# Patient Record
Sex: FEMALE | Race: WHITE
[De-identification: names, ages, dates, MRNs, and addresses within clinical notes are randomized per-mention and may not be internally consistent; named-entity substitution may affect disease eponyms.]

---

## 2019-06-23 ENCOUNTER — HOSPITAL ENCOUNTER (EMERGENCY)
Dept: HOSPITAL 74 - JERFT | Age: 19
Discharge: HOME | End: 2019-06-23
Payer: COMMERCIAL

## 2019-06-23 VITALS — BODY MASS INDEX: 20.7 KG/M2

## 2019-06-23 VITALS — TEMPERATURE: 98.6 F | SYSTOLIC BLOOD PRESSURE: 115 MMHG | HEART RATE: 101 BPM | DIASTOLIC BLOOD PRESSURE: 74 MMHG
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
## 2019-06-23 DIAGNOSIS — T81.31XA: Primary | ICD-10-CM

## 2019-06-23 NOTE — PDOC
History of Present Illness





- General


Chief Complaint: Revisit,Wound Recheck


Stated Complaint: STITCHES OPENING


Time Seen by Provider: 06/23/19 14:52


History Source: Patient


Exam Limitations: No Limitations





- History of Present Illness


Initial Comments: 





06/23/19 15:23


atient came for evaluation of postop breast reduction/plastic surgery performed 

in the [facility] [place] surgery was relatively uncomplicated, sutures were 

removed on Monday/5 days agobut noted yesterday some dehiscence of the wounds 

on the under aspect of breast suture line. Has some serous fluid but no 

purulent drainage, no fevers, is not painful. To take some Levaquin that she 

bought in the [place] "just in case"  


Severity: mild, moderate


Associated Symptoms: denies: fever/chills, headaches





Past History





- Travel


Traveled outside of the country in the last 30 days: No


Close contact w/someone who was outside of country & ill: No





- Past Medical History


Allergies/Adverse Reactions: 


 Allergies











Allergy/AdvReac Type Severity Reaction Status Date / Time


 


No Known Allergies Allergy   Verified 06/23/19 14:47











Home Medications: 


Ambulatory Orders





Cephalexin Monohydrate [Keflex -] 500 mg PO Q8H #21 capsule 06/23/19 








COPD: No





- Suicide/Smoking/Psychosocial Hx


Smoking History: Never smoked





**Review of Systems





- Review of Systems


Able to Perform ROS?: Yes


Is the patient limited English proficient: Yes


Constitutional: Yes: Symptoms Reported, See HPI, Malaise.  No: Chills, Fever


HEENTM: No: Symptoms Reported


Respiratory: No: Symptoms reported


Integumentary: Yes: Symptoms Reported, See HPI, Lesions, Lumps


Neurological: No: Symptoms reported


All Other Systems: Reviewed and Negative





*Physical Exam





- Vital Signs


 Last Vital Signs











Temp Pulse Resp BP Pulse Ox


 


 98.6 F   101 H  20   115/74   100 


 


 06/23/19 14:43  06/23/19 14:43  06/23/19 14:43  06/23/19 14:43  06/23/19 14:43














- Physical Exam


General Appearance: Yes: Nourished, Appropriately Dressed.  No: Apparent 

Distress


HEENT: positive: ROLLY, Normal ENT Inspection, TMs Normal, Pharynx Normal


Neck: positive: Supple.  negative: Tender, Lymphadenopathy (R), Lymphadenopathy 

(L)


Respiratory/Chest: positive: Lungs Clear, Normal Breath Sounds


Gastrointestinal/Abdominal: positive: Soft


Extremity: positive: Normal Capillary Refill, Normal Inspection, Normal Range 

of Motion


Integumentary: positive: Pale, Other (pproximating woundsfrom breastsurgery 

bilaterally, midline under nipplescarring well approximatedunderneath both 

breasts midpoint has dehiscence approximately 1 cm. Has no purulent drainage, 

discharge. Has some whitish discoloration that patient is using some type of 

drying and abiotic powder prescribed by plastic surgeon. Has no erythema, 

nopurulent drainage, no obvious sign of abscess.)


Neurologic: positive: CNs II-XII NML intact, Fully Oriented, Alert, Normal Mood/

Affect, Normal Response, Motor Strength 5/5





Medical Decision Making





- Medical Decision Making





06/23/19 16:19


postop breast reduction in the [place] with some minor wound 

dehiscence to bilateral breasts and the under aspect. O evidence of 

abscesshowever patient reported some purulent drainage at home therefore will 

give short course of Keflex to treat staph, encouraged patient to discontinue 

Levaquinthat was self prescribed





*DC/Admit/Observation/Transfer


Diagnosis at time of Disposition: 


Wound dehiscence, surgical


Qualifiers:


 Encounter type: initial encounter Qualified Code(s): T81.31XA - Disruption of 

external operation (surgical) wound, not elsewhere classified, initial encounter








- Discharge Dispostion


Disposition: HOME


Condition at time of disposition: Stable


Decision to Admit order: No





- Prescriptions


Prescriptions: 


Cephalexin Monohydrate [Keflex -] 500 mg PO Q8H #21 capsule





- Referrals


Referrals: 


Dre Palomino MD [Primary Care Provider] - 


Mushtaq French MD [Staff Physician] - 





- Patient Instructions


Printed Discharge Instructions:  How to Care for a Surgical Wound


Additional Instructions: 


Continue skin care as directed by plastic surgeon


keep wounds clean and dry


Avoid strenuous activity or heavy lifting for an additional 2 weeks or as 

directed by plastic surgeon





Turned to emergency department for redness, swelling, fevers or pain from areas





keflex one 500 mg tablet every 8 hours for one week





- Post Discharge Activity


Forms/Work/School Notes:  Back to Work